# Patient Record
Sex: FEMALE | Race: BLACK OR AFRICAN AMERICAN
[De-identification: names, ages, dates, MRNs, and addresses within clinical notes are randomized per-mention and may not be internally consistent; named-entity substitution may affect disease eponyms.]

---

## 2019-12-11 ENCOUNTER — HOSPITAL ENCOUNTER (EMERGENCY)
Dept: HOSPITAL 97 - ER | Age: 39
Discharge: HOME | End: 2019-12-11
Payer: COMMERCIAL

## 2019-12-11 DIAGNOSIS — Z88.1: ICD-10-CM

## 2019-12-11 DIAGNOSIS — J02.9: Primary | ICD-10-CM

## 2019-12-11 DIAGNOSIS — Z88.0: ICD-10-CM

## 2019-12-11 PROCEDURE — 87070 CULTURE OTHR SPECIMN AEROBIC: CPT

## 2019-12-11 PROCEDURE — 99283 EMERGENCY DEPT VISIT LOW MDM: CPT

## 2019-12-11 PROCEDURE — 87081 CULTURE SCREEN ONLY: CPT

## 2019-12-11 NOTE — ER
Nurse's Notes                                                                                     

 [facility]                                                                 

Name: Kim Hayes                                                                                

Age: 39 yrs                                                                                       

Sex: Female                                                                                       

: 1980                                                                                   

MRN: [id number]                                                                                   

Arrival Date: 2019                                                                          

Time: 19:18                                                                                       

Account#: [id number]                                                                            

Bed 7                                                                                             

Private MD:                                                                                       

Diagnosis: Acute pharyngitis                                                                      

                                                                                                  

Presentation:                                                                                     

                                                                                             

19:26 Presenting complaint: Patient states: right ear painX1 day and throat pain since the    ak1 

      day after Thanksgiving. Transition of care: patient was not received from another           

      setting of care. Onset of symptoms is unknown. Risk Assessment: Do you want to hurt         

      yourself or someone else? Patient reports no desire to harm self or others. Initial         

      Sepsis Screen: Does the patient meet any 2 criteria? No. Patient's initial sepsis           

      screen is negative. Does the patient have a suspected source of infection? No.              

      Patient's initial sepsis screen is negative. Care prior to arrival: None.                   

19:26 Method Of Arrival: Ambulatory                                                           ak1 

19:26 Acuity: SAAD 4                                                                           ak1 

                                                                                                  

Triage Assessment:                                                                                

19:27 General: Appears in no apparent distress.                                               ak1 

                                                                                                  

OB/GYN:                                                                                           

19:25 depo injection                                                                          ak1 

                                                                                                  

Historical:                                                                                       

- Allergies:                                                                                      

19:27 Amoxicillin;                                                                            ak1 

19:27 PENICILLINS;                                                                            ak1 

- Home Meds:                                                                                      

19:27 None [Active];                                                                          ak1 

- PMHx:                                                                                           

19:27 None;                                                                                   ak1 

- PSHx:                                                                                           

19:27 None;                                                                                   ak1 

                                                                                                  

- Immunization history:: Adult Immunizations unknown.                                             

- Social history:: Smoking status: Patient/guardian denies using tobacco.                         

- Ebola Screening: : No symptoms or risks identified at this time.                                

                                                                                                  

                                                                                                  

Screenin:33 Abuse screen: Denies threats or abuse. Denies injuries from another. Nutritional        wh  

      screening: No deficits noted. Tuberculosis screening: No symptoms or risk factors           

      identified. Fall Risk None identified.                                                      

                                                                                                  

Assessment:                                                                                       

19:33 General: Appears in no apparent distress. Behavior is calm, cooperative, appropriate    wh  

      for age. Pain: Complains of pain in Sore throat. Neuro: Level of Consciousness is           

      awake, alert, obeys commands, Oriented to person, place, time, situation, Appropriate       

      for age. Cardiovascular: Heart tones S1 S2. Respiratory: Airway is patent Respiratory       

      effort is even, unlabored, Respiratory pattern is regular, symmetrical, Breath sounds       

      are clear bilaterally. GI: Abdomen is flat, non-distended. : No signs and/or symptoms     

      were reported regarding the genitourinary system. EENT: Throat is reddened Reports Sore     

      throat. Derm: Skin is intact, is healthy with good turgor, Skin is pink, warm \T\ dry.      

      normal. Musculoskeletal: Circulation, motion, and sensation intact.                         

                                                                                                  

Vital Signs:                                                                                      

19:27 Resp 16; Temp 97.6(O); Weight 90.72 kg (R); Height 5 ft. 7 in. (170.18 cm) (R); Pain    ak1 

      7/10;                                                                                       

19:29  / 93; Pulse 72; Pulse Ox 100% on R/A;                                            ak1 

19:27 Body Mass Index 31.32 (90.72 kg, 170.18 cm)                                             ak1 

                                                                                                  

ED Course:                                                                                        

19:18 Patient arrived in ED.                                                                  ag3 

19:21 [facility]                                                        kb  

19:21 John Harvey MD is Attending Physician.                                             kb  

19:26 Triage completed.                                                                       ak1 

19:27 Arm band placed on Patient placed in an exam room, on a stretcher, Patient notified of  ak1 

      wait time.                                                                                  

19:33 Malgorzata Quijano is Primary Nurse.                                                           

19:35 Patient has correct armband on for positive identification. Bed in low position. Call     

      light in reach. Side rails up X 1. Pulse ox on. NIBP on.                                    

20:17 No provider procedures requiring assistance completed. Patient did not have IV access     

      during this emergency room visit.                                                           

                                                                                                  

Administered Medications:                                                                         

No medications were administered                                                                  

                                                                                                  

                                                                                                  

Outcome:                                                                                          

20:13 Discharge ordered by MD.                                                                kb  

20:17 Discharged to home ambulatory.                                                            

20:17 Condition: stable                                                                           

20:17 Discharge instructions given to patient, Instructed on discharge instructions, follow       

      up and referral plans. POC URTI, Pharyngitis Demonstrated understanding of                  

      instructions, follow-up care, POC                                                           

20:17 Patient left the ED.                                                                      

                                                                                                  

Signatures:                                                                                       

Maryjane Gaspar FNP-C FNP-Ckb Krenek, Amber RN                       RN   ak1                                                  

Malgorzata Quijano                                                                                   

Alice Millan                                 ag3                                                  

                                                                                                  

**************************************************************************************************

## 2019-12-11 NOTE — EDPHYS
Physician Documentation                                                                           

 [facility]                                                                 

Name: Kim Hayes                                                                                

Age: 39 yrs                                                                                       

Sex: Female                                                                                       

: 1980                                                                                   

MRN: [id number]                                                                                   

Arrival Date: 2019                                                                          

Time: 19:18                                                                                       

Account#: [id number]                                                                            

Bed 7                                                                                             

Private MD:                                                                                       

ED Physician John Harvey                                                                      

HPI:                                                                                              

                                                                                             

19:40 This 39 yrs old Black Female presents to ER via Ambulatory with complaints of Sore      kb  

      Throat, Ear Pain.                                                                           

19:40 The patient presents with sore throat. The patient describes throat pain as constant.   kb  

      Onset: The symptoms/episode began/occurred yesterday. Severity of symptoms: At their        

      worst the symptoms were moderate, in the emergency department the symptoms are              

      unchanged. Modifying factors: The symptoms are alleviated by nothing, the symptoms are      

      aggravated by swallowing, Patient's oral intake status: good. Associated signs and          

      symptoms: Pertinent positives: earache, Sore throat. The patient has not experienced        

      similar symptoms in the past. The patient has not recently seen a physician.                

                                                                                                  

OB/GYN:                                                                                           

19:25 depo injection                                                                          ak1 

                                                                                                  

Historical:                                                                                       

- Allergies:                                                                                      

19:27 Amoxicillin;                                                                            ak1 

19:27 PENICILLINS;                                                                            ak1 

- Home Meds:                                                                                      

19:27 None [Active];                                                                          ak1 

- PMHx:                                                                                           

19:27 None;                                                                                   ak1 

- PSHx:                                                                                           

19:27 None;                                                                                   ak1 

                                                                                                  

- Immunization history:: Adult Immunizations unknown.                                             

- Social history:: Smoking status: Patient/guardian denies using tobacco.                         

- Ebola Screening: : No symptoms or risks identified at this time.                                

                                                                                                  

                                                                                                  

ROS:                                                                                              

19:39 Constitutional: Negative for fever, chills, and weight loss, Neck: Negative for injury, kb  

      pain, and swelling, Cardiovascular: Negative for chest pain, palpitations, and edema,       

      Respiratory: Negative for shortness of breath, cough, wheezing, and pleuritic chest         

      pain, Abdomen/GI: Negative for abdominal pain, nausea, vomiting, diarrhea, and              

      constipation, Back: Negative for injury and pain, MS/Extremity: Negative for injury and     

      deformity, Skin: Negative for injury, rash, and discoloration, Neuro: Negative for          

      headache, weakness, numbness, tingling, and seizure.                                        

19:39 ENT: Positive for ear pain, sore throat.                                                    

                                                                                                  

Exam:                                                                                             

19:39 Constitutional:  This is a well developed, well nourished patient who is awake, alert,  kb  

      and in no acute distress. Head/Face:  Normocephalic, atraumatic. Neck:  Trachea             

      midline, no thyromegaly or masses palpated, and no cervical lymphadenopathy.  Supple,       

      full range of motion without nuchal rigidity, or vertebral point tenderness.  No            

      Meningismus. Chest/axilla:  Normal chest wall appearance and motion.  Nontender with no     

      deformity.  No lesions are appreciated. Cardiovascular:  Regular rate and rhythm with a     

      normal S1 and S2.  No gallops, murmurs, or rubs.  Normal PMI, no JVD.  No pulse             

      deficits. Respiratory:  Lungs have equal breath sounds bilaterally, clear to                

      auscultation and percussion.  No rales, rhonchi or wheezes noted.  No increased work of     

      breathing, no retractions or nasal flaring. Abdomen/GI:  Soft, non-tender, with normal      

      bowel sounds.  No distension or tympany.  No guarding or rebound.  No evidence of           

      tenderness throughout. Back:  No spinal tenderness.  No costovertebral tenderness.          

      Full range of motion. Skin:  Warm, dry with normal turgor.  Normal color with no            

      rashes, no lesions, and no evidence of cellulitis. MS/ Extremity:  Pulses equal, no         

      cyanosis.  Neurovascular intact.  Full, normal range of motion. Neuro:  Awake and           

      alert, GCS 15, oriented to person, place, time, and situation.  Cranial nerves II-XII       

      grossly intact.  Motor strength 5/5 in all extremities.  Sensory grossly intact.            

      Cerebellar exam normal.  Normal gait.                                                       

19:39 ENT: External ear(s): are unremarkable, Ear canal(s): are normal, TM's: are normal,         

      Nose: is normal, Mouth: is normal, Posterior pharynx: erythema, that is moderate.           

                                                                                                  

Vital Signs:                                                                                      

19:27 Resp 16; Temp 97.6(O); Weight 90.72 kg (R); Height 5 ft. 7 in. (170.18 cm) (R); Pain    ak1 

      7/10;                                                                                       

19:29  / 93; Pulse 72; Pulse Ox 100% on R/A;                                            ak1 

19:27 Body Mass Index 31.32 (90.72 kg, 170.18 cm)                                             ak1 

                                                                                                  

MDM:                                                                                              

19:21 Patient medically screened.                                                             kb  

19:40 Data reviewed: vital signs, nurses notes. Data interpreted: Pulse oximetry: on room air kb  

      is 100 %. Interpretation: normal.                                                           

20:11 Counseling: I had a detailed discussion with the patient and/or guardian regarding: the kb  

      historical points, exam findings, and any diagnostic results supporting the                 

      discharge/admit diagnosis, lab results, the need for outpatient follow up, a family         

      practitioner, to return to the emergency department if symptoms worsen or persist or if     

      there are any questions or concerns that arise at home.                                     

                                                                                                  

                                                                                             

19:21 Order name: Strep; Complete Time: 20:13                                                 kb  

                                                                                             

20:12 Order name: Throat Culture                                                              EDMS

                                                                                                  

Administered Medications:                                                                         

No medications were administered                                                                  

                                                                                                  

                                                                                                  

Disposition:                                                                                      

20:49 Co-signature as Attending Physician, John Harvey MD I agree with the assessment and  wa  

      plan of care.                                                                               

                                                                                                  

Disposition:                                                                                      

19 20:13 Discharged to Home. Impression: Acute pharyngitis.                                 

- Condition is Stable.                                                                            

- Discharge Instructions: Pharyngitis, Easy-to-Read, Viral Respiratory Infection,                 

  Easy-To-Read, Sore Throat, Easy-to-Read.                                                        

                                                                                                  

- Medication Reconciliation Form, Thank You Letter, Antibiotic Education, Prescription            

  Opioid Use form.                                                                                

- Follow up: Emergency Department; When: As needed; Reason: Worsening of condition.               

  Follow up: Private Physician; When: 2 - 3 days; Reason: Recheck today's complaints,             

  Continuance of care, Re-evaluation by your physician.                                           

                                                                                                  

                                                                                                  

                                                                                                  

Signatures:                                                                                       

Dispatcher [employer]                           EDMS                                                 

Maryjane Gaspar, CLIFF DASILVAP-Verna Brooks, RN                       RN   ak1                                                  

Malgorzata Quijano William, MD MD wa                                                   

                                                                                                  

Corrections: (The following items were deleted from the chart)                                    

20:17 20:13 2019 20:13 Discharged to Home. Impression: Acute pharyngitis. Condition is  wh  

      Stable. Discharge Instructions: Pharyngitis, Easy-to-Read, Viral Respiratory Infection,     

      Easy-To-Read, Sore Throat, Easy-to-Read. Forms are Medication Reconciliation Form,          

      Thank You Letter, Antibiotic Education, Prescription Opioid Use. Follow up: Emergency       

      Department; When: As needed; Reason: Worsening of condition. Follow up: Private             

      Physician; When: 2 - 3 days; Reason: Recheck today's complaints, Continuance of care,       

      Re-evaluation by your physician. kb                                                         

                                                                                                  

**************************************************************************************************

## 2019-12-12 VITALS — OXYGEN SATURATION: 100 % | SYSTOLIC BLOOD PRESSURE: 169 MMHG | DIASTOLIC BLOOD PRESSURE: 93 MMHG

## 2019-12-12 VITALS — TEMPERATURE: 97.6 F

## 2022-01-16 ENCOUNTER — HOSPITAL ENCOUNTER (EMERGENCY)
Dept: HOSPITAL 97 - ER | Age: 42
LOS: 1 days | Discharge: HOME | End: 2022-01-17
Payer: COMMERCIAL

## 2022-01-16 DIAGNOSIS — Z88.0: ICD-10-CM

## 2022-01-16 DIAGNOSIS — Z20.822: ICD-10-CM

## 2022-01-16 DIAGNOSIS — J06.9: Primary | ICD-10-CM

## 2022-01-16 DIAGNOSIS — Z88.1: ICD-10-CM

## 2022-01-16 LAB — SARS-COV-2 RNA RESP QL NAA+PROBE: NEGATIVE

## 2022-01-16 PROCEDURE — 71045 X-RAY EXAM CHEST 1 VIEW: CPT

## 2022-01-16 PROCEDURE — 87081 CULTURE SCREEN ONLY: CPT

## 2022-01-16 PROCEDURE — 99283 EMERGENCY DEPT VISIT LOW MDM: CPT

## 2022-01-16 PROCEDURE — 93005 ELECTROCARDIOGRAM TRACING: CPT

## 2022-01-16 PROCEDURE — 0240U: CPT

## 2022-01-16 PROCEDURE — 87070 CULTURE OTHR SPECIMN AEROBIC: CPT

## 2022-01-16 NOTE — XMS REPORT
Continuity of Care Document

                           Created on:2022



Patient:SHARON COYLE

Sex:Female

:1980

External Reference #:[id number]





Demographics







                          Address                   [address]



                                                    [place] [address]

 

                          Home Phone                [phone]

 

                          Work Phone                [phone]

 

                          Email Address             [email or web address]

 

                          Preferred Language        Unknown

 

                          Marital Status            Unknown

 

                          Sikh Affiliation     Unknown

 

                          Race                      Unknown

 

                          Additional Race(s)        Unavailable

 

                          Ethnic Group              Unknown









Author







                          Organization              [facility]

t

 

                          Address                   [address]



                                                    [place] [address]

 

                          Phone                     [phone]









Care Team Providers







                    Name                Role                Phone

 

                    Flower Eason  Attending Clinician [phone]

 

                    FLOWER MONTGOMERY      Attending Clinician Unavailable









Payers







           Payer Name Policy Type Policy Number Effective Date Expiration Date S

ource







Problems







       Condition Condition Condition Status Onset  Resolution Last   Treating Co

mments 

Source



       Name   Details Category        Date   Date   Treatment Clinician        



                                                 Date                 

 

       No known No known Disease                                           Unive

rs



       active active                                                  ity of



       problems problems                                                  [facility]







Allergies, Adverse Reactions, Alerts







       Allergy Allergy Status Severity Reaction(s) Onset  Inactive Treating Comm

ents 

Source



       Name   Type                        Date   Date   Clinician        

 

       NO KNOWN Drug   Active                                           [facility]



       ALLERGIE Class                                                   ity of



       S                                                              [facility]







Social History







           Social Habit Start Date Stop Date  Quantity   Comments   Source

 

           Sex Assigned At Birth                                             Uni

versity [facility]

 

           [facility]2                       Yes                   [facility]



           (event[facility]                                                [facility]









                Smoking Status  Start Date      Stop Date       Source

 

                Unknown if ever smoked                                 [facility]

y [facility]







Medications







       Ordered Filled Start  Stop   Current Ordering Indication Dosage Frequency

 Signature

                    Comments            Components          Source



     Medication Medication Date Date Medication? Clinician                (SIG) 

          



     Name Name                                                   

 

     cephALEXin      -0      Yes            500mg      500 mg,           Uni

vers



     (KEFLEX)      6-20                               Oral, Q6H,           ity o

f



     capsule 500      05:00:                               First dose           

[place]



     mg        00                                 on Sat           Medical



                                                  20 at           Branch



                                                  0000,           



                                                  Until           



                                                  Discontinu           



                                                  ed,            



                                                  ASAP<br>Re           



                                                  ason for           



                                                  Anti-Infec           



                                                  tive:           



                                                  Documented           



                                                  Infection<           



                                                  br>Documen           



                                                  gretchen            



                                                  Infection           



                                                  Site:           



                                                  Urine<br>D           



                                                  uration of           



                                                  Therapy: 7           



                                                  days           

 

     cephALEXin      2020-0 2020- No        [id number] 500mg      Take 1          

 [facility]



     (KEFLEX)      6-19 06-30                          capsule by           ity 

of



     500 mg      00:00: 04:59                          mouth 3           [place]



     capsule      00   :00                           (three)           Medical



                                                  times           [place]



                                                  daily for           



                                                  10 days.           







Vital Signs







             Vital Name   Observation Time Observation Value Comments     Source

 

             Systolic blood 2020 04:09:03 129 mm[Hg]                [facility]

sity of



             pressure                                            [facility]

 

             Diastolic blood 2020 04:09:03 67 mm[Hg]                 Unive

rsity of



             [facility]

 

             Heart rate   2020 04:09:03 83 /min                   [facility]

 

             Body temperature 2020 04:09:03 36.78 Cynthia                 [facility]

[facility]

 

             Respiratory rate 2020 04:09:03 18 /min                   [facility]

 

             Oxygen saturation in 2020 04:09:03 100 /min                  

[facility]



             Arterial blood by                                        [place] [facility]

ruben



             Pulse oximetry                                        [place]

 

             Body height  2020 02:43:00 170.2 cm                  [facility]

 

             Body weight  2020 02:43:00 108.863 kg                [facility]

 

             BMI          2020 02:43:00 37.59 kg/m2               [facility]







Procedures







                Procedure       Date / Time Performed Performing Clinician Sour

e

 

                URINALYSIS      2020 03:08:00 TRINA Montgomery [place] o

f [facility]

 

                COVID-19 (ID NOW RAPID 2020 03:08:00 TRINA Montgomery [facility]



                TESTING)                                        [facility]

 

                POCT PREGNANCY TEST 2020 03:05:00 TRINA Montgomery [facility]

 

                ASSIGNMENT OF BENEFITS 2020 02:22:37 Doctor Unassigned, No

 [facility]

 

                CONSENT/REFUSAL FOR 2020 02:18:09 Doctor Unassigned, No [facility]



                DIAGNOSIS AND                   [facility]



                TREATMENT                                       







Encounters







        Start   End     Encounter Admission Attending Care    Care    Encounter 

Source



        Date/Time Date/Time Type    Type    Clinicians Facility Department ID   

   

 

        2020 Emergency         TRINA Montgomery [facility]    [phone] [id number]

[id number] [facility]



        21:35:16 00:12:00                 Flower Baugh [id number]         i

[facility] [id number]         [facility]  [phone]         Medi

ruben



                                                        [address]             Branch

 

        2020 Emergency X       TRINA MONTGOMERY [facility]    ERT     [id number]

[id number] [facility]



        21:35:16 21:35:16                                                 [facility]







Results







           Test Description Test Time  Test Comments Results    Result Comments 

Source









                    COVID-19 (ID NOW RAPID TESTING) 2020 03:59:00 









                      Test Item  Value      Reference Range Interpretation Comme

nts









             SARS-CoV-2 Rapid ID NOW (test code Not Detected Not Detected       

       



             = 12509-6)                                          

 

             JOSSE (test code = JOSSE) ID NOW COVID-19 Assay is an                  

         



                          isothermal nucleic acid                           



                          amplification test intended for                       

    



                          the qualitative detection of                          

 



                          nucleic acid from SARS-CoV-2 viral                    

       



                          RNA in nasopharyngeal (NP)                           



                          specimens. It is used under                           



                          Emergency Use Authorization (EUA)                     

      



                          by FDA. The limit of detection                        

   



                          (LOD) of the assay is 125 Genome                      

     



                          Equivalents/mL. A positive result                     

      



                          is indicative of the presence of                      

     



                          SARS-CoV-2 RNA. ?Clinical                           



                          correlation with patient history                      

     



                          and other diagnostic information                      

     



                          is necessary to determine patient                     

      



                          infection status. A negative (Not                     

      



                          Detected) result does not preclude                    

       



                          SARS-CoV-2 infection. In patients                     

      



                          with clinical symptoms and other                      

     



                          tests that are consistent with                        

   



                          SARS-CoV-2 infection, negative                        

   



                          results should be treated as                          

 



                          presumptive negative and a new                        

   



                          specimen should be tested with                        

   



                          alternative PCR molecular test.                       

    



                          Invalid: Please collect a new                         

  



                          specimen for repeat patient                           



                          testing if clinically indicated.                      

     

 

             Lab Interpretation (test code = Normal                             

    



             94351-5)                                            



[facility]





             Test Item    Value        Reference Range Interpretation Comments

 

             APPEARANCE (test code = Clear        Clear                     



             4027984449)                                         

 

             COLOR (test code = Yellow       Yellow                    



             7837599956)                                         

 

             PH (test code =              4.8-8.0                   



             4481362630)                                         

 

             SP GRAVITY (test code =              1.003-1.030               



             3250609391)                                         

 

             GLU U QUAL (test code = Normal       Normal                    



             5136119595)                                         

 

             BLOOD (test code = 3+           Negative     A            



             5751851131)                                         

 

             KETONES (test code = Negative     Negative                  



             9408722757)                                         

 

             PROTEIN (test code = Negative     Negative                  



             2887-8)                                             

 

             UROBILIN (test code = 4.0 mg/dL    Normal       A            



             9438918275)                                         

 

             BILIRUBIN (test code = Negative     Negative                  



             9916451730)                                         

 

             NITRITE (test code = Negative     Negative                  



             1168872659)                                         

 

             LEUK SILVIO (test code = 25/uL        Negative     A            



             0465691447)                                         

 

             RBC/HPF (test code =              See_Comment  H             [Autom

ated message]



             2401021222)                                         The system [employer]



                                                                 generated this



                                                                 result transmit

gretchen



                                                                 reference range

: 0 -



                                                                 3 HPF. The refe

rence



                                                                 range was not u

sed



                                                                 to interpret th

is



                                                                 result as



                                                                 normal/abnormal

.

 

             WBC/HPF (test code =              See_Comment  H             [Autom

ated message]



             5123261490)                                         The system [employer]



                                                                 generated this



                                                                 result transmit

gretchen



                                                                 reference range

: 0 -



                                                                 5 HPF. The refe

rence



                                                                 range was not u

sed



                                                                 to interpret th

is



                                                                 result as



                                                                 normal/abnormal

.

 

             BACTERIA (test code = Few          Negative     A            



             9590661672)                                         

 

             MUCOUS (test code = Slight       Negative LPF A            



             7069272100)                                         

 

             SQ EPITH (test code =              HPF                       



             7994471518)                                         

 

             Lab Interpretation (test Abnormal                               



             code = 14000-6)                                        



[id number] 03:05:00





             Test Item    Value        Reference Range Interpretation Comments

 

             POCT PREG (test code = 1605) neg                                   

 

 

             On board controls acceptable with positive                         

      



             C Line (test code = 3574)                                        

 

             POCT PREG LOT # (test code = 3575) [id number]                      

       

 

             POCT PREG TEST  DATE (test 2021                        

     



             code = 3576)                                        

 

             Lab Interpretation (test code = Normal                             

    



             62547-3)                                            



[facility]

## 2022-01-17 VITALS — SYSTOLIC BLOOD PRESSURE: 165 MMHG | TEMPERATURE: 98.8 F | DIASTOLIC BLOOD PRESSURE: 84 MMHG | OXYGEN SATURATION: 100 %

## 2022-01-17 NOTE — EDPHYS
Physician Documentation                                                                           

 [facility]                                                                 

Name: Kim Hayes                                                                                

Age: 41 yrs                                                                                       

Sex: Female                                                                                       

: 1980                                                                                   

MRN: [id number]                                                                                   

Arrival Date: 2022                                                                          

Time: 21:23                                                                                       

Account#: [id number]                                                                            

Bed Waiting                                                                                       

Private MD:                                                                                       

ED Physician Jose Norris                                                                      

HPI:                                                                                              

                                                                                             

22:07 This 41 yrs old Black Female presents to ER via Ambulatory with complaints of Cough,    kb  

      Chest Congestion, Sore Throat, Chest Pressure.                                              

22:07 The patient or guardian reports cough. Onset: The symptoms/episode began/occurred       kb  

      yesterday. Severity of symptoms: At their worst the symptoms were moderate, in the          

      emergency department the symptoms are unchanged. Modifying factors: The symptoms are        

      alleviated by nothing, the symptoms are aggravated by nothing. Associated signs and         

      symptoms: Pertinent positives: sore throat. The patient has not experienced similar         

      symptoms in the past. The patient has not recently seen a physician. Pt reports cough,      

      chest pain, headache that started yesterday. Sore throat started over a week ago.           

                                                                                                  

OB/GYN:                                                                                           

21:51 LMP 2021                                                                          kd3 

                                                                                                  

Historical:                                                                                       

- Allergies:                                                                                      

21:51 Amoxicillin;                                                                            kd3 

21:51 PENICILLINS;                                                                            kd3 

- Home Meds:                                                                                      

21:51 None [Active];                                                                          kd3 

- PMHx:                                                                                           

21:51 None;                                                                                   kd3 

- PSHx:                                                                                           

21:51  section;                                                                       kd3 

                                                                                                  

- Immunization history:: Adult Immunizations up to date, Client reports having NOT                

  received the Covid vaccine. Flu vaccine is not up to date.                                      

- Social history:: Smoking status: Patient denies any tobacco usage or history of.                

                                                                                                  

                                                                                                  

ROS:                                                                                              

22:06 Constitutional: Negative for fever, chills, and weight loss.                            kb  

22:06 ENT: Positive for sore throat.                                                              

22:06 Cardiovascular: Positive for chest pain, Negative for edema, orthopnea, palpitations,       

      paroxysmal nocturnal dyspnea.                                                               

22:06 Respiratory: Positive for cough, Negative for dyspnea on exertion, hemoptysis,              

      orthopnea, pleurisy, shortness of breath, sputum production, wheezing.                      

22:06 Neuro: Positive for headache.                                                               

22:06 All other systems are negative.                                                             

                                                                                                  

Exam:                                                                                             

22:06 Constitutional:  This is a well developed, well nourished patient who is awake, alert,  kb  

      and in no acute distress. Head/Face:  Normocephalic, atraumatic. ENT:  Moist Mucous         

      membranes Cardiovascular:  Regular rate and rhythm with a normal S1 and S2.  No             

      gallops, murmurs, or rubs.  No pulse deficits. Respiratory:  Respirations even and          

      unlabored. No increased work of breathing. Talking in full sentences Skin:  Warm, dry       

      with normal turgor.  Normal color. MS/ Extremity:  Pulses equal, no cyanosis.               

      Neurovascular intact.  Full, normal range of motion. Neuro:  Awake and alert, GCS 15,       

      oriented to person, place, time, and situation. Moves all extremities. Normal gait.         

      Psych:  Awake, alert, with orientation to person, place and time.  Behavior, mood, and      

      affect are within normal limits.                                                            

22:10 ECG was reviewed by the Attending Physician.                                            kb  

                                                                                                  

Vital Signs:                                                                                      

21:47  / 84; Pulse 66; Resp 16; Temp 98.8; Pulse Ox 100% ; Weight 94.35 kg; Height 5    kd3 

      ft. 7 in. (170.18 cm); Pain 10/10;                                                          

21:47 Body Mass Index 32.58 (94.35 kg, 170.18 cm)                                             kd3 

                                                                                                  

MDM:                                                                                              

21:49 Patient medically screened.                                                             kb  

22:07 Data reviewed: vital signs, nurses notes. Data interpreted: Pulse oximetry: on room air kb  

      is 100 %. Interpretation: normal.                                                           

                                                                                             

00:10 Counseling: I had a detailed discussion with the patient and/or guardian regarding: the kb  

      historical points, exam findings, and any diagnostic results supporting the                 

      discharge/admit diagnosis, lab results, radiology results, the need for outpatient          

      follow up, a family practitioner, to return to the emergency department if symptoms         

      worsen or persist or if there are any questions or concerns that arise at home.             

                                                                                                  

                                                                                             

21:49 Order name: COVID-19/FLU A+B (Document "Date of Onset" if Symptomatic); Complete Time:  kb  

      23:00                                                                                       

                                                                                             

21:49 Order name: Strep; Complete Time: 22:37                                                 kb  

                                                                                             

21:49 Order name: EKG; Complete Time: 21:50                                                   kb  

                                                                                             

21:49 Order name: EKG - Nurse/Tech; Complete Time: 22:10                                      kb  

                                                                                             

21:49 Order name: Chest Single View XRAY                                                      kb  

                                                                                             

22:36 Order name: Throat Culture                                                              EDMS

                                                                                                  

EC/16                                                                                             

22:10 Rate is 59 beats/min. Rhythm is regular. QRS Axis is Normal. NC interval is normal at   kb  

      140 msec. QRS interval is normal at 86 msec. QT interval is normal at 410 msec.             

                                                                                                  

Administered Medications:                                                                         

No medications were administered                                                                  

                                                                                                  

                                                                                                  

Disposition:                                                                                      

                                                                                             

01:59 Co-signature as Attending Physician, Jose Norris MD.                                 [id number] 

                                                                                                  

Disposition Summary:                                                                              

22 00:11                                                                                    

Discharge Ordered                                                                                 

      Location: Home                                                                          kb  

      Condition: Stable                                                                       kb  

      Diagnosis                                                                                   

        - Acute upper respiratory infection, unspecified                                      kb  

      Followup:                                                                               kb  

        - With: Emergency Department                                                               

        - When: As needed                                                                          

        - Reason: Worsening of condition                                                           

      Followup:                                                                               kb  

        - With: Private Physician                                                                  

        - When: 2 - 3 days                                                                         

        - Reason: Recheck today's complaints, Continuance of care, Re-evaluation by your           

      physician                                                                                   

      Discharge Instructions:                                                                     

        - Discharge Summary Sheet                                                             kb  

        - Upper Respiratory Infection, Adult, Easy-to-Read                                    kb  

        - Viral Respiratory Infection, Easy-To-Read                                           kb  

      Forms:                                                                                      

        - Medication Reconciliation Form                                                      kb  

        - Thank You Letter                                                                    kb  

        - Antibiotic Education                                                                kb  

        - Prescription Opioid Use                                                             kb  

Signatures:                                                                                       

Dispatcher [employer]                           EDMS                                                 

Maryjane Gaspar, FNP-C                 FNP-Jose Charles MD MD   [facility]                                                  

Torri St, RN                      RN   kd3                                                  

                                                                                                  

Corrections: (The following items were deleted from the chart)                                    

                                                                                             

21:52 21:51 PSHx: None; kd3                                                                   kd3 

                                                                                                  

**************************************************************************************************

## 2022-01-17 NOTE — ER
Nurse's Notes                                                                                     

 [facility]                                                                 

Name: Kim Hayes                                                                                

Age: 41 yrs                                                                                       

Sex: Female                                                                                       

: 1980                                                                                   

MRN: [id number]                                                                                   

Arrival Date: 2022                                                                          

Time: 21:23                                                                                       

Account#: [id number]                                                                            

Bed Waiting                                                                                       

Private MD:                                                                                       

Diagnosis: Acute upper respiratory infection, unspecified                                         

                                                                                                  

Presentation:                                                                                     

                                                                                             

21:47 Chief complaint: Patient states: MY CHEST HURTS ON THE RIGHT SIDE. MY THROAT HURTS AND  kd3 

      I HAVE A COUGH AND A HEADACHE. I ALSO HAVE A SORE THROAT. Coronavirus screen: Vaccine       

      status: Patient reports being unvaccinated. Ebola Screen: No symptoms or risks              

      identified at this time. Initial Sepsis Screen: Does the patient meet any 2 criteria?       

      No. Patient's initial sepsis screen is negative. Does the patient have a suspected          

      source of infection? No. Patient's initial sepsis screen is negative. Risk Assessment:      

      Do you want to hurt yourself or someone else? Patient reports no desire to harm self or     

      others. Onset of symptoms was January 15, 2022.                                             

21:47 Method Of Arrival: Ambulatory                                                           kd3 

21:47 Acuity: SAAD 4                                                                           kd3 

                                                                                                  

Triage Assessment:                                                                                

21:51 General: Appears in no apparent distress. Behavior is calm, cooperative, appropriate    kd3 

      for age. Pain: Complains of pain in anterior aspect of left upper chest. EENT: No           

      deficits noted.                                                                             

                                                                                                  

OB/GYN:                                                                                           

21:51 LMP 2021                                                                          kd3 

                                                                                                  

Historical:                                                                                       

- Allergies:                                                                                      

21:51 Amoxicillin;                                                                            kd3 

21:51 PENICILLINS;                                                                            kd3 

- Home Meds:                                                                                      

21:51 None [Active];                                                                          kd3 

- PMHx:                                                                                           

21:51 None;                                                                                   kd3 

- PSHx:                                                                                           

21:51  section;                                                                       kd3 

                                                                                                  

- Immunization history:: Adult Immunizations up to date, Client reports having NOT                

  received the Covid vaccine. Flu vaccine is not up to date.                                      

- Social history:: Smoking status: Patient denies any tobacco usage or history of.                

                                                                                                  

                                                                                                  

Screenin/17                                                                                             

00:20 Abuse screen: Denies threats or abuse. Denies injuries from another. Nutritional        kd3 

      screening: No deficits noted. Tuberculosis screening: No symptoms or risk factors           

      identified. Fall Risk None identified.                                                      

                                                                                                  

Assessment:                                                                                       

00:21 Respiratory: Airway is patent Respiratory effort is even, unlabored, Breath sounds are  kd3 

      clear.                                                                                      

00:21 EENT: Throat is reddened.                                                               kd3 

                                                                                                  

Vital Signs:                                                                                      

                                                                                             

21:47  / 84; Pulse 66; Resp 16; Temp 98.8; Pulse Ox 100% ; Weight 94.35 kg; Height 5    kd3 

      ft. 7 in. (170.18 cm); Pain 10/10;                                                          

21:47 Body Mass Index 32.58 (94.35 kg, 170.18 cm)                                             kd3 

                                                                                                  

ED Course:                                                                                        

21:23 Patient arrived in ED.                                                                  jj6 

21:43 [facility]                                                        kb  

21:43 Jose Norris MD is Attending Physician.                                             kb  

21:47 Torri St, RN is Primary Nurse.                                                    kd3 

21:51 Triage completed.                                                                       kd3 

21:51 Arm band placed on right wrist.                                                         kd3 

22:10 Strep Sent.                                                                             kd3 

22:10 COVID-19/FLU A+B (Document "Date of Onset" if Symptomatic) Sent.                        kd3 

23:04 Chest Single View XRAY In Process Unspecified.                                          EDMS

                                                                                             

00:20 Patient has correct armband on for positive identification.                             kd3 

00:20 No provider procedures requiring assistance completed. Patient did not have IV access   kd3 

      during this emergency room visit.                                                           

                                                                                                  

Administered Medications:                                                                         

No medications were administered                                                                  

                                                                                                  

                                                                                                  

Outcome:                                                                                          

00:11 Discharge ordered by MD.                                                                kb  

00:20 Discharged to home ambulatory.                                                          kd3 

00:20 Condition: stable                                                                           

00:20 Discharge instructions given to patient.                                                    

00:22 Patient left the ED.                                                                    kd3 

                                                                                                  

Signatures:                                                                                       

Dispatcher [employer]                           EDMS                                                 

Maryjane Gaspar FNP-C FNP-Meggan Gill                           jj6                                                  

Torri St, RN                      RN   kd3                                                  

                                                                                                  

Corrections: (The following items were deleted from the chart)                                    

                                                                                             

21:52 21:51 PSHx: None; kd3                                                                   kd3 

                                                                                                  

**************************************************************************************************

## 2022-01-17 NOTE — RAD REPORT
EXAM DESCRIPTION:  RAD - Chest Single View - 1/16/2022 11:04 pm

 

CLINICAL HISTORY:  CHEST PAIN.

 

COMPARISON:  None.

 

TECHNIQUE:  Single view AP chest radiograph(s).

 

FINDINGS:  Trace perihilar interstitial thickening. No infiltrate identified. No pleural effusion. No
 pneumothorax. Nonenlarged cardiomediastinal silhouette. No significant osseous abnormality.

 

IMPRESSION:  Trace perihilar interstitial thickening. No infiltrate identified.

 

Electronically signed by:   Cammie Nguyen MD   1/16/2022 11:17 PM CST Workstation: [id number]

 

 

Due to temporary technical issues with the PACS/Fluency reporting system, reports are being signed by
 the in house radiologist without review as a courtesy to ensure prompt reporting. The interpreting r
adiologist is fully responsible for the content of the report.

## 2022-01-19 NOTE — EKG
Test Date:    2022-01-16               Test Time:    22:05:59

Technician:   BLANCHE                                     

                                                     

MEASUREMENT RESULTS:                                       

Intervals:                                           

Rate:         59                                     

PA:           140                                    

QRSD:         86                                     

QT:           410                                    

QTc:          405                                    

Axis:                                                

P:            9                                      

PA:           140                                    

QRS:          15                                     

T:            3                                      

                                                     

INTERPRETIVE STATEMENTS:                                       

                                                     

Sinus bradycardia

Otherwise normal ECG

No previous ECG available for comparison



Electronically Signed On 01-19-22 07:26:49 CST by Jay Marie